# Patient Record
Sex: FEMALE | Race: ASIAN | NOT HISPANIC OR LATINO
[De-identification: names, ages, dates, MRNs, and addresses within clinical notes are randomized per-mention and may not be internally consistent; named-entity substitution may affect disease eponyms.]

---

## 2021-12-27 PROBLEM — Z00.00 ENCOUNTER FOR PREVENTIVE HEALTH EXAMINATION: Status: ACTIVE | Noted: 2021-12-27

## 2022-01-05 ENCOUNTER — APPOINTMENT (OUTPATIENT)
Dept: CARDIOLOGY | Facility: CLINIC | Age: 82
End: 2022-01-05

## 2022-04-08 ENCOUNTER — APPOINTMENT (OUTPATIENT)
Dept: CARDIOLOGY | Facility: CLINIC | Age: 82
End: 2022-04-08
Payer: MEDICAID

## 2022-04-08 VITALS
RESPIRATION RATE: 16 BRPM | OXYGEN SATURATION: 94 % | HEIGHT: 59.84 IN | WEIGHT: 120 LBS | TEMPERATURE: 97.6 F | BODY MASS INDEX: 23.56 KG/M2 | HEART RATE: 65 BPM | DIASTOLIC BLOOD PRESSURE: 79 MMHG | SYSTOLIC BLOOD PRESSURE: 146 MMHG

## 2022-04-08 DIAGNOSIS — R06.02 SHORTNESS OF BREATH: ICD-10-CM

## 2022-04-08 PROCEDURE — 99204 OFFICE O/P NEW MOD 45 MIN: CPT

## 2022-04-08 PROCEDURE — 93306 TTE W/DOPPLER COMPLETE: CPT

## 2022-04-10 PROBLEM — R06.02 SOB (SHORTNESS OF BREATH): Status: ACTIVE | Noted: 2022-04-10

## 2022-04-10 NOTE — HISTORY OF PRESENT ILLNESS
[FreeTextEntry1] : 81 year-old female with HTN presents for evaluation of abnormal ECG. Patient denies CP, palpitations, lightheadedness or h/o syncope. She reports SOB walking uphills. She is on Amlodipine 2.5 mg. Patient has trace edema and varicose vein.  I advised patient to undergo an echocardiogram. I advised patient to wear a Holter monitor.

## 2022-04-14 ENCOUNTER — NON-APPOINTMENT (OUTPATIENT)
Age: 82
End: 2022-04-14

## 2022-04-15 ENCOUNTER — NON-APPOINTMENT (OUTPATIENT)
Age: 82
End: 2022-04-15

## 2022-10-10 PROBLEM — R94.31 ABNORMAL ECG: Status: ACTIVE | Noted: 2022-04-08

## 2022-10-10 PROBLEM — I10 HTN (HYPERTENSION): Status: ACTIVE | Noted: 2022-04-10

## 2022-10-11 ENCOUNTER — NON-APPOINTMENT (OUTPATIENT)
Age: 82
End: 2022-10-11

## 2022-10-11 ENCOUNTER — APPOINTMENT (OUTPATIENT)
Dept: CARDIOLOGY | Facility: CLINIC | Age: 82
End: 2022-10-11

## 2022-10-11 VITALS
RESPIRATION RATE: 18 BRPM | OXYGEN SATURATION: 99 % | SYSTOLIC BLOOD PRESSURE: 148 MMHG | TEMPERATURE: 96.9 F | BODY MASS INDEX: 23.36 KG/M2 | WEIGHT: 119 LBS | DIASTOLIC BLOOD PRESSURE: 77 MMHG | HEART RATE: 59 BPM

## 2022-10-11 DIAGNOSIS — R00.2 PALPITATIONS: ICD-10-CM

## 2022-10-11 DIAGNOSIS — R94.31 ABNORMAL ELECTROCARDIOGRAM [ECG] [EKG]: ICD-10-CM

## 2022-10-11 DIAGNOSIS — I10 ESSENTIAL (PRIMARY) HYPERTENSION: ICD-10-CM

## 2022-10-11 PROCEDURE — 93015 CV STRESS TEST SUPVJ I&R: CPT

## 2022-10-11 PROCEDURE — 99214 OFFICE O/P EST MOD 30 MIN: CPT | Mod: 25

## 2022-10-11 PROCEDURE — 93000 ELECTROCARDIOGRAM COMPLETE: CPT | Mod: 59

## 2022-10-11 RX ORDER — PROPRANOLOL HYDROCHLORIDE 10 MG/1
10 TABLET ORAL TWICE DAILY
Qty: 30 | Refills: 1 | Status: ACTIVE | OUTPATIENT
Start: 2022-10-11

## 2022-10-11 NOTE — HISTORY OF PRESENT ILLNESS
[FreeTextEntry1] : 10/11/22 - Patient reports that 1 month ago she got upset and since then has been experiencing palpitations and SOB.  ECG showed non-sinus atrial rhythm with PVC's.  Patient has trace LE edema.  I advised patient to undergo a treadmill stress test.  I advised patient to wear a 7-day [employer] Holter.  \par \par 4/8/22 - Patient denies CP, palpitations, lightheadedness or h/o syncope. She reports SOB walking [place]. She is on Amlodipine 2.5 mg. Patient has trace edema and varicose vein.  I advised patient to undergo an echocardiogram. I advised patient to wear a Holter monitor.

## 2022-10-11 NOTE — REASON FOR VISIT
[FreeTextEntry1] : 82 year-old female with HTN presents for followup.  \par \par Patient was last seen on 4/8/22 for evaluation of abnormal ECG, showing non-sinus atrial rhythm.  Patient underwent an echocardiogram and it showed normal LV function without significant valvular pathology.  Patient wore a Holter and it showed average HR 68, 1 PVC, and rare PACs, couplets and 1 triplet. 9 pauses up to 2.2 sec. \par \par She is on Amlodipine 2.5 mg for HTN.\par \par Patient underwent an echocardiogram 4/8/22 and it showed normal LV function without significant valvular pathology.  \par \par Patient wore a Holter 4/8/22 and it showed average HR 68, 1 PVC, and rare PACs, couplets and 1 triplet. 9 pauses up to 2.2 sec.

## 2022-10-11 NOTE — PHYSICAL EXAM
[Well Developed] : well developed [Well Nourished] : well nourished [No Acute Distress] : no acute distress [Normal Conjunctiva] : normal conjunctiva [Normal Venous Pressure] : normal venous pressure [No Carotid Bruit] : no carotid bruit [Normal S1, S2] : normal S1, S2 [No Murmur] : no murmur [No Rub] : no rub [No Gallop] : no gallop [Clear Lung Fields] : clear lung fields [Good Air Entry] : good air entry [No Respiratory Distress] : no respiratory distress  [Soft] : abdomen soft [Non Tender] : non-tender [No Masses/organomegaly] : no masses/organomegaly [Normal Bowel Sounds] : normal bowel sounds [Normal Gait] : normal gait [No Cyanosis] : no cyanosis [No Clubbing] : no clubbing [No Varicosities] : no varicosities [Moves all extremities] : moves all extremities [No Focal Deficits] : no focal deficits [Normal Speech] : normal speech [Alert and Oriented] : alert and oriented [Normal memory] : normal memory [de-identified] : Patient has trace LE edema.

## 2022-10-26 ENCOUNTER — FORM ENCOUNTER (OUTPATIENT)
Age: 82
End: 2022-10-26

## 2022-10-28 ENCOUNTER — NON-APPOINTMENT (OUTPATIENT)
Age: 82
End: 2022-10-28

## 2022-11-03 ENCOUNTER — FORM ENCOUNTER (OUTPATIENT)
Age: 82
End: 2022-11-03

## 2022-11-16 ENCOUNTER — NON-APPOINTMENT (OUTPATIENT)
Age: 82
End: 2022-11-16

## 2022-11-18 ENCOUNTER — NON-APPOINTMENT (OUTPATIENT)
Age: 82
End: 2022-11-18